# Patient Record
Sex: FEMALE | URBAN - METROPOLITAN AREA
[De-identification: names, ages, dates, MRNs, and addresses within clinical notes are randomized per-mention and may not be internally consistent; named-entity substitution may affect disease eponyms.]

---

## 2019-08-30 NOTE — TELEPHONE ENCOUNTER
RECORDS STATUS - ALL OTHER DIAGNOSIS      RECORDS RECEIVED FROM: [facility]   DATE RECEIVED: 8/30/19   NOTES STATUS DETAILS   OFFICE NOTE from referring provider Enrique Cope   OFFICE NOTE from medical oncologist NA    DISCHARGE SUMMARY from hospital NA    DISCHARGE REPORT from the ER NA    OPERATIVE REPORT NA    MEDICATION LIST CE 8/29/19   CLINICAL TRIAL TREATMENTS TO DATE     LABS     PATHOLOGY REPORTS     ANYTHING RELATED TO DIAGNOSIS Epic/CE 8/29/19   GENONOMIC TESTING     TYPE:     IMAGING (NEED IMAGES & REPORT)     CT SCANS     MRI     MAMMO     ULTRASOUND     PET

## 2019-08-30 NOTE — TELEPHONE ENCOUNTER
ONCOLOGY INTAKE: Records Information      APPT INFORMATION: [id number] Dr. Miguelina Mckee  Referring provider:  Dr. Mandy Cope  Referring provider s clinic:  [facility] [place]  Reason for visit/diagnosis:  Hyperhidrosis  Has patient been notified of appointment date and time?: Yes    RECORDS INFORMATION:  Were the records received with the referral (via [employer])? NO    Has patient been seen for any external appt for this diagnosis? Yes    If yes, where? [facility]    Has patient had any imaging or procedures outside of [place] for this condition? No      If Yes, where? NA    ADDITIONAL INFORMATION:  None

## 2019-09-05 ENCOUNTER — OFFICE VISIT (OUTPATIENT)
Dept: SURGERY | Facility: CLINIC | Age: 34
End: 2019-09-05
Attending: STUDENT IN AN ORGANIZED HEALTH CARE EDUCATION/TRAINING PROGRAM
Payer: MEDICAID

## 2019-09-05 ENCOUNTER — PRE VISIT (OUTPATIENT)
Dept: SURGERY | Facility: CLINIC | Age: 34
End: 2019-09-05

## 2019-09-05 VITALS
TEMPERATURE: 97.9 F | WEIGHT: 129 LBS | HEART RATE: 85 BPM | BODY MASS INDEX: 20.25 KG/M2 | HEIGHT: 67 IN | RESPIRATION RATE: 14 BRPM | OXYGEN SATURATION: 100 % | DIASTOLIC BLOOD PRESSURE: 76 MMHG | SYSTOLIC BLOOD PRESSURE: 129 MMHG

## 2019-09-05 DIAGNOSIS — R61 HYPERHIDROSIS: Primary | ICD-10-CM

## 2019-09-05 PROBLEM — E55.9 VITAMIN D DEFICIENCY: Status: ACTIVE | Noted: 2019-09-05

## 2019-09-05 PROBLEM — D64.9 ANEMIA: Status: ACTIVE | Noted: 2019-09-05

## 2019-09-05 PROBLEM — J30.9 ALLERGIC RHINITIS: Status: ACTIVE | Noted: 2019-09-05

## 2019-09-05 PROBLEM — N92.0 MENORRHAGIA WITH REGULAR CYCLE: Status: ACTIVE | Noted: 2019-09-05

## 2019-09-05 PROBLEM — D50.9 IRON DEFICIENCY ANEMIA: Status: ACTIVE | Noted: 2019-09-05

## 2019-09-05 PROCEDURE — G0463 HOSPITAL OUTPT CLINIC VISIT: HCPCS | Mod: ZF

## 2019-09-05 RX ORDER — ACYCLOVIR 400 MG/1
400 TABLET ORAL
COMMUNITY
Start: 2019-08-29

## 2019-09-05 RX ORDER — FLUTICASONE PROPIONATE 50 MCG
2 SPRAY, SUSPENSION (ML) NASAL
COMMUNITY
Start: 2017-04-13

## 2019-09-05 RX ORDER — LANOLIN ALCOHOL/MO/W.PET/CERES
1000 CREAM (GRAM) TOPICAL
COMMUNITY

## 2019-09-05 RX ORDER — FOLIC ACID 1 MG/1
1 TABLET ORAL
COMMUNITY

## 2019-09-05 SDOH — HEALTH STABILITY: MENTAL HEALTH: HOW OFTEN DO YOU HAVE A DRINK CONTAINING ALCOHOL?: NEVER

## 2019-09-05 ASSESSMENT — PAIN SCALES - GENERAL: PAINLEVEL: NO PAIN (0)

## 2019-09-05 ASSESSMENT — MIFFLIN-ST. JEOR: SCORE: 1314.83

## 2019-09-05 NOTE — PROGRESS NOTES
THORACIC SURGERY - NEW PATIENT OFFICE VISIT      Dear Dr. Cope,    I saw Ms. Chang at Dr. Cope s request in consultation for the evaluation and treatment of a hyperhidrosis.     HPI  Mrs. Samira Chang is a 34 year old year-old female presents with worsening axillary, palmar and plantar hyperhidrosis in the last year and a half despite regular use of drysol. She reports having issues with hyperhidrosis over the past 15 years that was initially triggered by heat and activity; however, in the past year or so she has woken up with palms and soles purfusely sweating. She reports it is now interfering with her work and quality of life.    Previsit Tests   No imaging  8/29/19 TSH 3.43    PMH  Anxiety  Iron deficiency anemia  Hyperhidrosis      PSH  None     ETOH none  TOB none    Physical examination  BMI 20.5  Clammy hands. No visible moisture.    From a personal perspective, she is here alone. She returned 2 months ago from working in [employer].    IMPRESSION (R61) Hyperhidrosis  (primary encounter diagnosis)    .   34 year old year-old female with hyperhidrosis affecting mostly her palms    PLAN  I spent a total of 30 minutes with Ms. Chang, more than 50% of which were spent in counseling, coordination of care, and face-to-face time. I reviewed the plan as follows:  Referral to dermatology for hyperhidrosis  If she fails 2 dermatological treatments, we will consider bilateral thoracoscopic sympathectomy.  Procedure planned: Bilateral thoracoscopic sympathetic chain clipping at T4. I reviewed the indications, risks, and benefits of the procedure with Ms. Samira Luna. We discussed pain management, expected immediate control of focal hyperhidrosis, compensatory sweating (almost always mild), and the rare potential of heart rate changes that may or may not impact exercise ability. We also talked about potential recurrence over time (~5-15% at 5-10 years), which is possibly higher when surgery is  performed in very young patients (<18 years old).    All questions were answered and Samira Chang and present family were in agreement with the plan.  I appreciate the opportunity to participate in the care of your patient and will keep you updated.  Sincerely,

## 2019-09-05 NOTE — LETTER
9/5/2019       RE: Samira Chang  [address] [address]     Dear Colleague,    Thank you for referring your patient, Samira Chang, to the [facility]. Please see a copy of my visit note below.    THORACIC SURGERY - NEW PATIENT OFFICE VISIT      Dear Dr. Cope,    I saw Ms. Chang at Dr. Cope s request in consultation for the evaluation and treatment of a hyperhidrosis.     HPI  Mrs. Samira Chang is a 34 year old year-old female presents with worsening axillary, palmar and plantar hyperhidrosis in the last year and a half despite regular use of drysol. She reports having issues with hyperhidrosis over the past 15 years that was initially triggered by heat and activity; however, in the past year or so she has woken up with palms and soles purfusely sweating. She reports it is now interfering with her work and quality of life.    Previsit Tests   No imaging  8/29/19 TSH 3.43    PMH  Anxiety  Iron deficiency anemia  Hyperhidrosis      PSH  None     ETOH none  TOB none    Physical examination  BMI 20.5  Clammy hands. No visible moisture.    From a personal perspective, she is here alone. She returned 2 months ago from working in [employer].    IMPRESSION (R61) Hyperhidrosis  (primary encounter diagnosis)    .   34 year old year-old female with hyperhidrosis affecting mostly her palms    PLAN  I spent a total of 30 minutes with Ms. Chang, more than 50% of which were spent in counseling, coordination of care, and face-to-face time. I reviewed the plan as follows:  Referral to dermatology for hyperhidrosis  If she fails 2 dermatological treatments, we will consider bilateral thoracoscopic sympathectomy.  Procedure planned: Bilateral thoracoscopic sympathetic chain clipping at T4. I reviewed the indications, risks, and benefits of the procedure with Ms. Samira Luna. We discussed pain management, expected immediate control of focal hyperhidrosis, compensatory  sweating (almost always mild), and the rare potential of heart rate changes that may or may not impact exercise ability. We also talked about potential recurrence over time (~5-15% at 5-10 years), which is possibly higher when surgery is performed in very young patients (<18 years old).    All questions were answered and Samira Chang and present family were in agreement with the plan.  I appreciate the opportunity to participate in the care of your patient and will keep you updated.  Sincerely,  Miguelina Mckee MD

## 2019-09-05 NOTE — NURSING NOTE
"Oncology Rooming Note    September 5, 2019 4:24 PM   Samira Chang is a 34 year old female who presents for:    Chief Complaint   Patient presents with     Oncology Clinic Visit     New Patient Visit for Hyperhidrosis     Initial Vitals: /76   Pulse 85   Temp 97.9  F (36.6  C) (Oral)   Resp 14   Ht 1.689 m (5' 6.5\")   Wt 58.5 kg (129 lb)   LMP 09/05/2019 (Approximate)   SpO2 100%   Breastfeeding? No   BMI 20.51 kg/m   Estimated body mass index is 20.51 kg/m  as calculated from the following:    Height as of this encounter: 1.689 m (5' 6.5\").    Weight as of this encounter: 58.5 kg (129 lb). Body surface area is 1.66 meters squared.  No Pain (0) Comment: Data Unavailable   Patient's last menstrual period was 09/05/2019 (approximate).  Allergies reviewed: Yes  Medications reviewed: Yes    Medications: Medication refills not needed today.  Pharmacy name entered into [facility]: [facility] #[id number] - [place] [id number]    Clinical concerns: First appt today & she is hoping for answers regarding her diagnosis & treatment options.   Mckee was notified.      Brigette Saldaña, RN , MSN              "